# Patient Record
Sex: FEMALE | Race: WHITE | Employment: FULL TIME | URBAN - METROPOLITAN AREA
[De-identification: names, ages, dates, MRNs, and addresses within clinical notes are randomized per-mention and may not be internally consistent; named-entity substitution may affect disease eponyms.]

---

## 2024-08-21 ENCOUNTER — HOSPITAL ENCOUNTER (OUTPATIENT)
Dept: LABOR AND DELIVERY | Age: 29
Discharge: HOME | End: 2024-08-21

## 2024-08-21 ENCOUNTER — HOSPITAL ENCOUNTER (INPATIENT)
Age: 29
LOS: 1 days | Discharge: HOME | End: 2024-08-23
Attending: OBSTETRICS & GYNECOLOGY | Admitting: OBSTETRICS & GYNECOLOGY
Payer: OTHER GOVERNMENT

## 2024-08-21 LAB
ABO/RH: NORMAL
AMPHETAMINES: NEGATIVE
ANTIBODY SCREEN: NEGATIVE
BARBITURATE SCREEN URINE: NEGATIVE
BENZODIAZEPINE SCREEN, URINE: NEGATIVE
CANNABINOID SCREEN URINE: NEGATIVE
COCAINE METABOLITE: NEGATIVE
FENTANYL URINE: NEGATIVE
HCT VFR BLD CALC: 36.2 % (ref 37–47)
HEMOGLOBIN: 11.9 GM/DL (ref 12.5–16)
MCH RBC QN AUTO: 27.8 PG (ref 27–31)
MCHC RBC AUTO-ENTMCNC: 32.9 % (ref 32–36)
MCV RBC AUTO: 84.6 FL (ref 78–100)
OPIATES, URINE: NEGATIVE
OXYCODONE: NEGATIVE
PDW BLD-RTO: 13.2 % (ref 11.7–14.9)
PLATELET # BLD: 220 K/CU MM (ref 140–440)
PMV BLD AUTO: 11.4 FL (ref 7.5–11.1)
RBC # BLD: 4.28 M/CU MM (ref 4.2–5.4)
WBC # BLD: 12.3 K/CU MM (ref 4–10.5)

## 2024-08-21 PROCEDURE — 36415 COLL VENOUS BLD VENIPUNCTURE: CPT

## 2024-08-21 PROCEDURE — 86900 BLOOD TYPING SEROLOGIC ABO: CPT

## 2024-08-21 PROCEDURE — 6370000000 HC RX 637 (ALT 250 FOR IP): Performed by: OBSTETRICS & GYNECOLOGY

## 2024-08-21 PROCEDURE — 86901 BLOOD TYPING SEROLOGIC RH(D): CPT

## 2024-08-21 PROCEDURE — 85027 COMPLETE CBC AUTOMATED: CPT

## 2024-08-21 PROCEDURE — 80307 DRUG TEST PRSMV CHEM ANLYZR: CPT

## 2024-08-21 PROCEDURE — 86780 TREPONEMA PALLIDUM: CPT

## 2024-08-21 PROCEDURE — 86850 RBC ANTIBODY SCREEN: CPT

## 2024-08-21 PROCEDURE — 6360000002 HC RX W HCPCS: Performed by: OBSTETRICS & GYNECOLOGY

## 2024-08-21 PROCEDURE — 2580000003 HC RX 258: Performed by: OBSTETRICS & GYNECOLOGY

## 2024-08-21 RX ORDER — FAMOTIDINE 10 MG/ML
20 INJECTION, SOLUTION INTRAVENOUS 2 TIMES DAILY PRN
Status: DISCONTINUED | OUTPATIENT
Start: 2024-08-21 | End: 2024-08-22 | Stop reason: HOSPADM

## 2024-08-21 RX ORDER — LIDOCAINE HYDROCHLORIDE 10 MG/ML
30 INJECTION, SOLUTION EPIDURAL; INFILTRATION; INTRACAUDAL; PERINEURAL PRN
Status: DISCONTINUED | OUTPATIENT
Start: 2024-08-21 | End: 2024-08-23 | Stop reason: HOSPADM

## 2024-08-21 RX ORDER — METHYLERGONOVINE MALEATE 0.2 MG/ML
200 INJECTION INTRAVENOUS PRN
Status: DISCONTINUED | OUTPATIENT
Start: 2024-08-21 | End: 2024-08-23 | Stop reason: HOSPADM

## 2024-08-21 RX ORDER — ONDANSETRON 4 MG/1
4 TABLET, ORALLY DISINTEGRATING ORAL EVERY 6 HOURS PRN
Status: DISCONTINUED | OUTPATIENT
Start: 2024-08-21 | End: 2024-08-22 | Stop reason: SDUPTHER

## 2024-08-21 RX ORDER — SODIUM CHLORIDE, SODIUM LACTATE, POTASSIUM CHLORIDE, AND CALCIUM CHLORIDE .6; .31; .03; .02 G/100ML; G/100ML; G/100ML; G/100ML
1000 INJECTION, SOLUTION INTRAVENOUS PRN
Status: DISCONTINUED | OUTPATIENT
Start: 2024-08-21 | End: 2024-08-23 | Stop reason: HOSPADM

## 2024-08-21 RX ORDER — CARBOPROST TROMETHAMINE 250 UG/ML
250 INJECTION, SOLUTION INTRAMUSCULAR PRN
Status: DISCONTINUED | OUTPATIENT
Start: 2024-08-21 | End: 2024-08-23 | Stop reason: HOSPADM

## 2024-08-21 RX ORDER — ASPIRIN 81 MG/1
81 TABLET, CHEWABLE ORAL DAILY
COMMUNITY

## 2024-08-21 RX ORDER — TRANEXAMIC ACID 10 MG/ML
1000 INJECTION, SOLUTION INTRAVENOUS
Status: DISCONTINUED | OUTPATIENT
Start: 2024-08-21 | End: 2024-08-22 | Stop reason: SDUPTHER

## 2024-08-21 RX ORDER — ONDANSETRON 2 MG/ML
4 INJECTION INTRAMUSCULAR; INTRAVENOUS EVERY 6 HOURS PRN
Status: DISCONTINUED | OUTPATIENT
Start: 2024-08-21 | End: 2024-08-22 | Stop reason: SDUPTHER

## 2024-08-21 RX ORDER — SODIUM CHLORIDE 0.9 % (FLUSH) 0.9 %
5-40 SYRINGE (ML) INJECTION PRN
Status: DISCONTINUED | OUTPATIENT
Start: 2024-08-21 | End: 2024-08-22 | Stop reason: SDUPTHER

## 2024-08-21 RX ORDER — FENTANYL CITRATE 50 UG/ML
100 INJECTION, SOLUTION INTRAMUSCULAR; INTRAVENOUS
Status: DISCONTINUED | OUTPATIENT
Start: 2024-08-21 | End: 2024-08-22 | Stop reason: HOSPADM

## 2024-08-21 RX ORDER — METOPROLOL TARTRATE 25 MG/1
50 TABLET, FILM COATED ORAL 2 TIMES DAILY
Status: DISCONTINUED | OUTPATIENT
Start: 2024-08-21 | End: 2024-08-23 | Stop reason: HOSPADM

## 2024-08-21 RX ORDER — SODIUM CHLORIDE, SODIUM LACTATE, POTASSIUM CHLORIDE, CALCIUM CHLORIDE 600; 310; 30; 20 MG/100ML; MG/100ML; MG/100ML; MG/100ML
INJECTION, SOLUTION INTRAVENOUS CONTINUOUS
Status: DISCONTINUED | OUTPATIENT
Start: 2024-08-21 | End: 2024-08-23 | Stop reason: HOSPADM

## 2024-08-21 RX ORDER — SODIUM CHLORIDE 0.9 % (FLUSH) 0.9 %
5-40 SYRINGE (ML) INJECTION EVERY 12 HOURS SCHEDULED
Status: DISCONTINUED | OUTPATIENT
Start: 2024-08-21 | End: 2024-08-22 | Stop reason: SDUPTHER

## 2024-08-21 RX ORDER — SODIUM CHLORIDE, SODIUM LACTATE, POTASSIUM CHLORIDE, AND CALCIUM CHLORIDE .6; .31; .03; .02 G/100ML; G/100ML; G/100ML; G/100ML
500 INJECTION, SOLUTION INTRAVENOUS PRN
Status: DISCONTINUED | OUTPATIENT
Start: 2024-08-21 | End: 2024-08-23 | Stop reason: HOSPADM

## 2024-08-21 RX ORDER — TERBUTALINE SULFATE 1 MG/ML
0.25 INJECTION, SOLUTION SUBCUTANEOUS
Status: ACTIVE | OUTPATIENT
Start: 2024-08-21 | End: 2024-08-22

## 2024-08-21 RX ORDER — MISOPROSTOL 200 UG/1
400 TABLET ORAL PRN
OUTPATIENT
Start: 2024-08-21

## 2024-08-21 RX ORDER — SODIUM CHLORIDE 9 MG/ML
25 INJECTION, SOLUTION INTRAVENOUS PRN
Status: DISCONTINUED | OUTPATIENT
Start: 2024-08-21 | End: 2024-08-23 | Stop reason: HOSPADM

## 2024-08-21 RX ADMIN — SODIUM CHLORIDE, POTASSIUM CHLORIDE, SODIUM LACTATE AND CALCIUM CHLORIDE: 600; 310; 30; 20 INJECTION, SOLUTION INTRAVENOUS at 21:18

## 2024-08-21 RX ADMIN — METOPROLOL TARTRATE 50 MG: 25 TABLET, FILM COATED ORAL at 22:54

## 2024-08-21 RX ADMIN — Medication 1 MILLI-UNITS/MIN: at 21:40

## 2024-08-22 ENCOUNTER — ANESTHESIA EVENT (OUTPATIENT)
Dept: LABOR AND DELIVERY | Age: 29
End: 2024-08-22
Payer: OTHER GOVERNMENT

## 2024-08-22 ENCOUNTER — ANESTHESIA (OUTPATIENT)
Dept: LABOR AND DELIVERY | Age: 29
End: 2024-08-22
Payer: OTHER GOVERNMENT

## 2024-08-22 PROBLEM — Z34.90 ENCOUNTER FOR INDUCTION OF LABOR: Status: ACTIVE | Noted: 2024-08-22

## 2024-08-22 LAB
GLUCOSE BLD-MCNC: 82 MG/DL (ref 70–99)
T. PALLIDUM SCREEN: NEGATIVE

## 2024-08-22 PROCEDURE — 82962 GLUCOSE BLOOD TEST: CPT

## 2024-08-22 PROCEDURE — 1220000000 HC SEMI PRIVATE OB R&B

## 2024-08-22 PROCEDURE — 51702 INSERT TEMP BLADDER CATH: CPT

## 2024-08-22 PROCEDURE — 6370000000 HC RX 637 (ALT 250 FOR IP): Performed by: OBSTETRICS & GYNECOLOGY

## 2024-08-22 PROCEDURE — 6360000002 HC RX W HCPCS: Performed by: NURSE ANESTHETIST, CERTIFIED REGISTERED

## 2024-08-22 PROCEDURE — 6360000002 HC RX W HCPCS: Performed by: OBSTETRICS & GYNECOLOGY

## 2024-08-22 PROCEDURE — 3700000025 EPIDURAL BLOCK: Performed by: ANESTHESIOLOGY

## 2024-08-22 PROCEDURE — 2580000003 HC RX 258: Performed by: OBSTETRICS & GYNECOLOGY

## 2024-08-22 PROCEDURE — 7200000001 HC VAGINAL DELIVERY

## 2024-08-22 RX ORDER — MISOPROSTOL 200 UG/1
400 TABLET ORAL PRN
Status: DISCONTINUED | OUTPATIENT
Start: 2024-08-22 | End: 2024-08-23 | Stop reason: HOSPADM

## 2024-08-22 RX ORDER — ONDANSETRON 4 MG/1
4 TABLET, ORALLY DISINTEGRATING ORAL EVERY 6 HOURS PRN
Status: DISCONTINUED | OUTPATIENT
Start: 2024-08-22 | End: 2024-08-23 | Stop reason: HOSPADM

## 2024-08-22 RX ORDER — TRANEXAMIC ACID 10 MG/ML
1000 INJECTION, SOLUTION INTRAVENOUS
Status: ACTIVE | OUTPATIENT
Start: 2024-08-22 | End: 2024-08-23

## 2024-08-22 RX ORDER — ACETAMINOPHEN 500 MG
1000 TABLET ORAL EVERY 8 HOURS SCHEDULED
Status: DISCONTINUED | OUTPATIENT
Start: 2024-08-22 | End: 2024-08-23 | Stop reason: HOSPADM

## 2024-08-22 RX ORDER — ROPIVACAINE HYDROCHLORIDE 2 MG/ML
11 INJECTION, SOLUTION EPIDURAL; INFILTRATION; PERINEURAL CONTINUOUS
Status: DISCONTINUED | OUTPATIENT
Start: 2024-08-22 | End: 2024-08-23 | Stop reason: HOSPADM

## 2024-08-22 RX ORDER — ONDANSETRON 2 MG/ML
4 INJECTION INTRAMUSCULAR; INTRAVENOUS EVERY 6 HOURS PRN
Status: DISCONTINUED | OUTPATIENT
Start: 2024-08-22 | End: 2024-08-23 | Stop reason: HOSPADM

## 2024-08-22 RX ORDER — SODIUM CHLORIDE 0.9 % (FLUSH) 0.9 %
5-40 SYRINGE (ML) INJECTION PRN
Status: DISCONTINUED | OUTPATIENT
Start: 2024-08-22 | End: 2024-08-23 | Stop reason: HOSPADM

## 2024-08-22 RX ORDER — SODIUM CHLORIDE 9 MG/ML
INJECTION, SOLUTION INTRAVENOUS PRN
Status: DISCONTINUED | OUTPATIENT
Start: 2024-08-22 | End: 2024-08-23 | Stop reason: HOSPADM

## 2024-08-22 RX ORDER — IBUPROFEN 800 MG/1
800 TABLET, FILM COATED ORAL EVERY 8 HOURS
Status: DISCONTINUED | OUTPATIENT
Start: 2024-08-22 | End: 2024-08-23 | Stop reason: HOSPADM

## 2024-08-22 RX ORDER — MISOPROSTOL 200 UG/1
800 TABLET ORAL PRN
Status: DISCONTINUED | OUTPATIENT
Start: 2024-08-22 | End: 2024-08-23 | Stop reason: HOSPADM

## 2024-08-22 RX ORDER — LANOLIN 72 %
OINTMENT (GRAM) TOPICAL PRN
Status: DISCONTINUED | OUTPATIENT
Start: 2024-08-22 | End: 2024-08-23 | Stop reason: HOSPADM

## 2024-08-22 RX ORDER — NALOXONE HYDROCHLORIDE 0.4 MG/ML
INJECTION, SOLUTION INTRAMUSCULAR; INTRAVENOUS; SUBCUTANEOUS PRN
Status: DISCONTINUED | OUTPATIENT
Start: 2024-08-22 | End: 2024-08-23 | Stop reason: HOSPADM

## 2024-08-22 RX ORDER — DOCUSATE SODIUM 100 MG/1
100 CAPSULE, LIQUID FILLED ORAL 2 TIMES DAILY
Status: DISCONTINUED | OUTPATIENT
Start: 2024-08-22 | End: 2024-08-23 | Stop reason: HOSPADM

## 2024-08-22 RX ORDER — FERROUS SULFATE 325(65) MG
325 TABLET ORAL EVERY OTHER DAY
Status: DISCONTINUED | OUTPATIENT
Start: 2024-08-23 | End: 2024-08-23 | Stop reason: HOSPADM

## 2024-08-22 RX ORDER — SODIUM CHLORIDE 0.9 % (FLUSH) 0.9 %
5-40 SYRINGE (ML) INJECTION EVERY 12 HOURS SCHEDULED
Status: DISCONTINUED | OUTPATIENT
Start: 2024-08-22 | End: 2024-08-23 | Stop reason: HOSPADM

## 2024-08-22 RX ADMIN — ROPIVACAINE HYDROCHLORIDE 10 ML: 2 INJECTION, SOLUTION EPIDURAL; INFILTRATION at 06:11

## 2024-08-22 RX ADMIN — SODIUM CHLORIDE, POTASSIUM CHLORIDE, SODIUM LACTATE AND CALCIUM CHLORIDE: 600; 310; 30; 20 INJECTION, SOLUTION INTRAVENOUS at 06:12

## 2024-08-22 RX ADMIN — METOPROLOL TARTRATE 50 MG: 25 TABLET, FILM COATED ORAL at 09:51

## 2024-08-22 RX ADMIN — ONDANSETRON 4 MG: 2 INJECTION INTRAMUSCULAR; INTRAVENOUS at 09:45

## 2024-08-22 RX ADMIN — ROPIVACAINE HYDROCHLORIDE 11 ML/HR: 2 INJECTION, SOLUTION EPIDURAL; INFILTRATION at 06:17

## 2024-08-22 RX ADMIN — SODIUM CHLORIDE, POTASSIUM CHLORIDE, SODIUM LACTATE AND CALCIUM CHLORIDE: 600; 310; 30; 20 INJECTION, SOLUTION INTRAVENOUS at 13:49

## 2024-08-22 RX ADMIN — METOPROLOL TARTRATE 50 MG: 25 TABLET, FILM COATED ORAL at 21:08

## 2024-08-22 RX ADMIN — SODIUM CHLORIDE, POTASSIUM CHLORIDE, SODIUM LACTATE AND CALCIUM CHLORIDE: 600; 310; 30; 20 INJECTION, SOLUTION INTRAVENOUS at 05:01

## 2024-08-22 RX ADMIN — ROPIVACAINE HYDROCHLORIDE 11 ML/HR: 2 INJECTION, SOLUTION EPIDURAL; INFILTRATION at 12:16

## 2024-08-22 RX ADMIN — DOCUSATE SODIUM 100 MG: 100 CAPSULE, LIQUID FILLED ORAL at 21:08

## 2024-08-22 ASSESSMENT — PAIN DESCRIPTION - PAIN TYPE: TYPE: ACUTE PAIN

## 2024-08-22 ASSESSMENT — PAIN - FUNCTIONAL ASSESSMENT: PAIN_FUNCTIONAL_ASSESSMENT: ACTIVITIES ARE NOT PREVENTED

## 2024-08-22 ASSESSMENT — PAIN SCALES - GENERAL
PAINLEVEL_OUTOF10: 0
PAINLEVEL_OUTOF10: 4

## 2024-08-22 ASSESSMENT — PAIN DESCRIPTION - LOCATION: LOCATION: ABDOMEN

## 2024-08-22 ASSESSMENT — PAIN DESCRIPTION - FREQUENCY: FREQUENCY: INTERMITTENT

## 2024-08-22 ASSESSMENT — PAIN DESCRIPTION - ONSET: ONSET: ON-GOING

## 2024-08-22 ASSESSMENT — PAIN DESCRIPTION - ORIENTATION: ORIENTATION: LOWER

## 2024-08-22 ASSESSMENT — PAIN DESCRIPTION - DESCRIPTORS: DESCRIPTORS: CRAMPING

## 2024-08-22 NOTE — ANESTHESIA PRE PROCEDURE
Department of Anesthesiology  Preprocedure Note       Name:  Muna Menendez   Age:  29 y.o.  :  1995                                          MRN:  [id number]         Date:  2024      Surgeon: * No surgeons listed *    Procedure: * No procedures listed *    Medications prior to admission:   Prior to Admission medications    Medication Sig Start Date End Date Taking? Authorizing Provider   metFORMIN (GLUCOPHAGE) 500 MG tablet Take 1 tablet by mouth at bedtime   Yes ProviderToo MD   aspirin 81 MG chewable tablet Take 1 tablet by mouth daily   Yes Provider, MD Too   sertraline (ZOLOFT) 50 MG tablet Take 1 tablet by mouth daily   Yes ProviderToo MD   Prenatal MV-Min-Fe Fum-FA-DHA (PRENATAL 1 PO) Take 1 tablet by mouth in the morning and at bedtime   Yes ProviderToo MD   metoprolol tartrate (LOPRESSOR) 50 MG tablet TAKE 1 TABLET BY MOUTH TWICE A DAY 10/30/18  Yes Too Starkey MD   omeprazole (PRILOSEC) 40 MG delayed release capsule TAKE ONE CAPSULE BY MOUTH EVERY DAY 10/11/18  Yes Provider, MD Too       Current medications:    Current Facility-Administered Medications   Medication Dose Route Frequency Provider Last Rate Last Admin    lactated ringers IV soln infusion   IntraVENous Continuous Gustabo Ron  mL/hr at 24 0502 Rate Change at 24 0502    lactated ringers bolus 500 mL  500 mL IntraVENous PRN Gustabo Ron MD        Or    lactated ringers bolus 1,000 mL  1,000 mL IntraVENous PRN Gustabo Ron MD        sodium chloride flush 0.9 % injection 5-40 mL  5-40 mL IntraVENous 2 times per day Gustabo Ron MD        sodium chloride flush 0.9 % injection 5-40 mL  5-40 mL IntraVENous PRN Gustabo Ron MD        0.9 % sodium chloride infusion  25 mL IntraVENous PRN Gustabo Ron MD        oxytocin (PITOCIN) 30 units in 500 mL infusion  1-20 trini-units/min IntraVENous Continuous

## 2024-08-22 NOTE — FLOWSHEET NOTE
1459 Patient actively pushing.  RN remains in continuous attendance at the bedside.  Assessment & evaluation of fetal heart rate ongoing via continuous EFM.    1529 RN remained at bedside throughout pushing.  EFM continuously assessed.  Vaginal delivery of viable infant.

## 2024-08-22 NOTE — PLAN OF CARE
Problem: Vaginal Birth or  Section  Goal: Fetal and maternal status remain reassuring during the birth process  Description:  Birth OB-Pregnancy care plan goal which identifies if the fetal and maternal status remain reassuring during the birth process  2024 by Laurie Huynh RN  Outcome: Progressing  Flowsheets (Taken 2024)  Fetal and Maternal Status Remain Reassuring During the Birth Process:   Monitor vital signs   Monitor uterine activity   Monitor fetal heart rate   Monitor labor progression (Vaginal delivery)   Deep vein thrombosis prophylaxis ( delivery)  2024 by Domonique Del Valle RN  Outcome: Progressing     Problem: Postpartum  Goal: Experiences normal postpartum course  Description:  Postpartum OB-Pregnancy care plan goal which identifies if the mother is experiencing a normal postpartum course  2024 by Laurie Huynh RN  Outcome: Progressing  Flowsheets (Taken 2024)  Experiences Normal Postpartum Course:   Monitor maternal vital signs   Assess uterine involution  2024 by Domonique Del Valle RN  Outcome: Progressing  Goal: Appropriate maternal -  bonding  Description:  Postpartum OB-Pregnancy care plan goal which identifies if the mother and  are bonding appropriately  2024 by Laurie Huynh RN  Outcome: Progressing  2024 by Domonique Del Valle RN  Outcome: Progressing  Goal: Establishment of infant feeding pattern  Description:  Postpartum OB-Pregnancy care plan goal which identifies if the mother is establishing a feeding pattern with their   2024 by Laurie Huynh RN  Outcome: Progressing  Flowsheets (Taken 2024)  Establishment of Infant Feeding Pattern:   Assess breast/bottle feeding   Refer to lactation as needed  2024 by Domoinque Del Valle RN  Outcome: Progressing  Goal: Incisions, wounds, or drain sites healing without S/S of infection  2024  by Huynh, Laurie, RN  Outcome: Progressing  Flowsheets (Taken 8/22/2024 0845)  Incisions, Wounds, or Drain Sites Healing Without Sign and Symptoms of Infection:   ADMISSION and DAILY: Assess and document risk factors for pressure ulcer development   TWICE DAILY: Assess and document skin integrity   TWICE DAILY: Assess and document dressing/incision, wound bed, drain sites and surrounding tissue   Implement wound care per orders   Initiate isolation precautions as appropriate   Initiate pressure ulcer prevention bundle as indicated  8/22/2024 0256 by Domonique Del Valle RN  Outcome: Progressing     Problem: Pain  Goal: Verbalizes/displays adequate comfort level or baseline comfort level  8/22/2024 0845 by Laurie Huynh RN  Outcome: Progressing  Flowsheets (Taken 8/22/2024 0845)  Verbalizes/displays adequate comfort level or baseline comfort level:   Encourage patient to monitor pain and request assistance   Assess pain using appropriate pain scale   Administer analgesics based on type and severity of pain and evaluate response   Implement non-pharmacological measures as appropriate and evaluate response   Consider cultural and social influences on pain and pain management   Notify Licensed Independent Practitioner if interventions unsuccessful or patient reports new pain  8/22/2024 0256 by Domonique Del Valle RN  Outcome: Progressing  Flowsheets (Taken 8/21/2024 2140)  Verbalizes/displays adequate comfort level or baseline comfort level:   Encourage patient to monitor pain and request assistance   Assess pain using appropriate pain scale   Implement non-pharmacological measures as appropriate and evaluate response   Administer analgesics based on type and severity of pain and evaluate response     Problem: Infection - Adult  Goal: Absence of infection at discharge  8/22/2024 0845 by Laurie Huynh RN  Outcome: Progressing  Flowsheets (Taken 8/22/2024 0845)  Absence of infection at discharge:   Assess and monitor for

## 2024-08-22 NOTE — LACTATION NOTE
Mother states she did take an on line breast feeding class.    Initiated breast feeding and breast feeding teaching. Mother states she would like help with breast feeding and gives permission for breast to be touched by IBCLC to assist with latch on and positioning of infant. Discuss with mother different position changes: side lying, cradle hold, and football hold. Discuss with mother that breast feeding babies should breast feed every 2-3 hours for 10 to 15 minutes on each side. Also discuss with mother to listen and watch for infant feeding cues and that the baby may want to breast feed more frequently. Discuss with mother that she has colostrum for the first few days until her milk comes in and that this is enough for the baby the first few days. Explained to mother that the stomach of the baby is small so it fills up quickly and then empties quickly and that is why the infant needs to breast feed frequently. Discuss with mother that she needs to hold the infant head securely during feedings and to hold her breast with her hand to help guide the breast in infant mouth, and that the infant needs to have a deep latch on, more than just the nipple. Explained to mother that this helps stimulate the milk ducts which are farther back on the breast to produce and release milk and also helps to decrease soreness. Explained to mother that if the baby latches on to just the nipple it will increase soreness for her. Discuss with mother that when the infant is latched on well, he will suckle and then take rest from suckling, but that he should stay latched on and that he should suckle more than pause. Lanolin ointment given to mother and explain how to use on nipple to help if nipples become sore. Encouraged mother to allow nipples to air dry after feedings to also help decrease soreness. Mother verbalizes understanding. Mother ask appropriate questions. Encouraged mother to call for any assistance with breast feeding or  any other needs.      Breastfeeding Your Baby booklet given and feeding log sheets given.    States she does have her electric breast pump.

## 2024-08-22 NOTE — PROGRESS NOTES
Department of Obstetrics and Gynecology  Labor and Delivery   Attending Progress Note      SUBJECTIVE:  doing well, comfortable with epidural. Some nausea and vomiting    OBJECTIVE: Blood pressure (!) 107/58, pulse 85, temperature 98.3 °F (36.8 °C), temperature source Oral, resp. rate 17, height 1.803 m (5' 11\"), weight 128.4 kg (283 lb), SpO2 98%.    Fetal heart rate:       Baseline Heart Rate:  140s        Accelerations:  present       Long Term Variability:  moderate       Decelerations:  absent         Contraction frequency: 4 minutes    Membranes:  Ruptured clear fluid    Cervix:         Dilation:  7 cm         Effacement:  90         Station:  0         Position:  anterior             ASSESSMENT 29 y.o. female at 38w1d stalled a bit at 6-7      PLAN: IUPC placed and contraction strength is weak with contractions still spaced. Will carefully increase pitocin beyond 20, judiciously up to a max of 30 if needed.

## 2024-08-22 NOTE — ANESTHESIA PROCEDURE NOTES
Epidural Block    Patient location during procedure: OB  Start time: 8/22/2024 5:55 AM  End time: 8/22/2024 6:03 AM  Reason for block: labor epidural  Staffing  Performed: resident/CRNA   Resident/CRNA: Hector Ventura APRN - CRNA  Performed by: Hector Ventura APRN - CRNA  Authorized by: Onur Fernández MD    Epidural  Patient position: sitting  Prep: ChloraPrep and site prepped and draped  Patient monitoring: continuous pulse ox and frequent blood pressure checks  Approach: midline  Location: L3-4  Injection technique: WILFRED saline  Provider prep: mask and sterile gloves  Needle  Needle type: Tuohy   Needle gauge: 17 G  Needle length: 3.5 in  Needle insertion depth: 7 cm  Catheter type: side hole  Catheter size: 20 G  Catheter at skin depth: 13 cm  Test dose: negativeCatheter Secured: tegaderm and tape  Assessment  Sensory level: T8  Hemodynamics: stable  Attempts: 2  Outcomes: uncomplicated and patient tolerated procedure well  Additional Notes  First attempt (+) blood return in catheter  Preanesthetic Checklist  Completed: patient identified, IV checked, site marked, risks and benefits discussed, surgical/procedural consents, equipment checked, pre-op evaluation, timeout performed, anesthesia consent given, oxygen available, monitors applied/VS acknowledged, fire risk safety assessment completed and verbalized and blood product R/B/A discussed and consented

## 2024-08-22 NOTE — H&P
Department of Obstetrics and Gynecology   Obstetrics History and Physical        CHIEF COMPLAINT:   Chief Complaint   Patient presents with    Scheduled Induction     GDM; 38+0, denies bleeding or LOF, +FM         HISTORY OF PRESENT ILLNESS:      The patient is a 29 y.o. female at 38w0d.  OB History          1    Para        Term                AB        Living             SAB        IAB        Ectopic        Molar        Multiple        Live Births                Patient presents with a chief complaint as above and is being admitted for gestational hypertension    Estimated Due Date: Estimated Date of Delivery: 24    PRENATAL CARE:    Complicated by: gestational HTN on metoprolol 100 mg BID, Gest DMA2, obesity    PAST OB HISTORY  OB History          1    Para        Term                AB        Living             SAB        IAB        Ectopic        Molar        Multiple        Live Births                    Past Medical History:        Diagnosis Date    Diabetes mellitus (HCC)     GDM    Hypertension     Thyroid disease     lobectomy     Past Surgical History:        Procedure Laterality Date    THYROID LOBECTOMY      TONSILLECTOMY AND ADENOIDECTOMY       Allergies:  Patient has no known allergies.  Social History:    Social History     Socioeconomic History    Marital status:      Spouse name: Not on file    Number of children: Not on file    Years of education: Not on file    Highest education level: Not on file   Occupational History    Not on file   Tobacco Use    Smoking status: Never    Smokeless tobacco: Never   Vaping Use    Vaping status: Never Used   Substance and Sexual Activity    Alcohol use: No    Drug use: No    Sexual activity: Not on file   Other Topics Concern    Not on file   Social History Narrative    Not on file     Social Determinants of Health     Financial Resource Strain: Not on file   Food Insecurity: No Food Insecurity (2024)    Hunger Vital

## 2024-08-22 NOTE — FLOWSHEET NOTE
Patient transferred to MB-16 via wheelchair pushed by RN. Baby transferred with mother via crib pushed by FOB.

## 2024-08-22 NOTE — PLAN OF CARE
Problem: Vaginal Birth or  Section  Goal: Fetal and maternal status remain reassuring during the birth process  Description:  Birth OB-Pregnancy care plan goal which identifies if the fetal and maternal status remain reassuring during the birth process  Outcome: Progressing     Problem: Postpartum  Goal: Experiences normal postpartum course  Description:  Postpartum OB-Pregnancy care plan goal which identifies if the mother is experiencing a normal postpartum course  Outcome: Progressing  Goal: Appropriate maternal -  bonding  Description:  Postpartum OB-Pregnancy care plan goal which identifies if the mother and  are bonding appropriately  Outcome: Progressing  Goal: Establishment of infant feeding pattern  Description:  Postpartum OB-Pregnancy care plan goal which identifies if the mother is establishing a feeding pattern with their   Outcome: Progressing  Goal: Incisions, wounds, or drain sites healing without S/S of infection  Outcome: Progressing     Problem: Pain  Goal: Verbalizes/displays adequate comfort level or baseline comfort level  Outcome: Progressing  Flowsheets (Taken 2024)  Verbalizes/displays adequate comfort level or baseline comfort level:   Encourage patient to monitor pain and request assistance   Assess pain using appropriate pain scale   Implement non-pharmacological measures as appropriate and evaluate response   Administer analgesics based on type and severity of pain and evaluate response     Problem: Infection - Adult  Goal: Absence of infection at discharge  Outcome: Progressing  Goal: Absence of infection during hospitalization  Outcome: Progressing  Goal: Absence of fever/infection during anticipated neutropenic period  Outcome: Progressing     Problem: Safety - Adult  Goal: Free from fall injury  Outcome: Progressing     Problem: Discharge Planning  Goal: Discharge to home or other facility with appropriate resources  Outcome: Progressing

## 2024-08-22 NOTE — PLAN OF CARE
Problem: Vaginal Birth or  Section  Goal: Fetal and maternal status remain reassuring during the birth process  Description:  Birth OB-Pregnancy care plan goal which identifies if the fetal and maternal status remain reassuring during the birth process  2024 by Laurie Huynh RN  Outcome: Progressing  Flowsheets (Taken 2024)  Fetal and Maternal Status Remain Reassuring During the Birth Process:   Monitor vital signs   Monitor uterine activity   Monitor fetal heart rate   Monitor labor progression (Vaginal delivery)   Deep vein thrombosis prophylaxis ( delivery)  2024 by Domonique Del Valle RN  Outcome: Progressing     Problem: Postpartum  Goal: Experiences normal postpartum course  Description:  Postpartum OB-Pregnancy care plan goal which identifies if the mother is experiencing a normal postpartum course  2024 by Laurie Huynh RN  Outcome: Progressing  Flowsheets (Taken 2024)  Experiences Normal Postpartum Course:   Monitor maternal vital signs   Assess uterine involution  2024 by Domonique Del Valle RN  Outcome: Progressing  Goal: Appropriate maternal -  bonding  Description:  Postpartum OB-Pregnancy care plan goal which identifies if the mother and  are bonding appropriately  2024 by Laurie Huynh RN  Outcome: Progressing  2024 by Domonique Del Valle RN  Outcome: Progressing  Goal: Establishment of infant feeding pattern  Description:  Postpartum OB-Pregnancy care plan goal which identifies if the mother is establishing a feeding pattern with their   2024 by Laurie Huynh RN  Outcome: Progressing  Flowsheets (Taken 2024)  Establishment of Infant Feeding Pattern:   Assess breast/bottle feeding   Refer to lactation as needed  2024 by Domonique Del Valle RN  Outcome: Progressing  Goal: Incisions, wounds, or drain sites healing without S/S of infection  2024  indicated   Position to facilitate oxygenation and minimize respiratory effort   Encourage broncho-pulmonary hygiene including cough, deep breathe, incentive spirometry   Assess the need for suctioning and aspirate as needed   Assess and instruct to report shortness of breath or any respiratory difficulty   Respiratory therapy support as indicated     Problem: Risk for Deficient Fluid Volume  Goal: Hemodynamic stability and optimal renal function maintained  Description: Interventions:.  -Monitor labs and assess for signs and symptoms of volume excess or deficit    -Monitor intake, output and patient weight  -Monitor response to interventions for patient's volume status, including labs, urine output, blood pressure (other measures as available)  -Fluid restriction as ordered  -Instruct patient on fluid and nutrition restrictions as appropriate      Outcome: Progressing  Flowsheets (Taken 8/22/2024 1607)  Hemodynamic stability and optimal renal function maintained:   Monitor labs and assess for signs and symptoms of volume excess or deficit   Monitor intake, output and patient weight   Monitor response to interventions for patient's volume status, including labs, urine output, blood pressure (other measures as available)   Fluid restriction as ordered   Instruct patient on fluid and nutrition restrictions as appropriate

## 2024-08-23 VITALS
SYSTOLIC BLOOD PRESSURE: 118 MMHG | HEART RATE: 100 BPM | TEMPERATURE: 98.6 F | BODY MASS INDEX: 39.62 KG/M2 | DIASTOLIC BLOOD PRESSURE: 87 MMHG | OXYGEN SATURATION: 99 % | RESPIRATION RATE: 16 BRPM | WEIGHT: 283 LBS | HEIGHT: 71 IN

## 2024-08-23 PROCEDURE — 0KQM0ZZ REPAIR PERINEUM MUSCLE, OPEN APPROACH: ICD-10-PCS | Performed by: OBSTETRICS & GYNECOLOGY

## 2024-08-23 PROCEDURE — 3E033VJ INTRODUCTION OF OTHER HORMONE INTO PERIPHERAL VEIN, PERCUTANEOUS APPROACH: ICD-10-PCS | Performed by: OBSTETRICS & GYNECOLOGY

## 2024-08-23 PROCEDURE — 10907ZC DRAINAGE OF AMNIOTIC FLUID, THERAPEUTIC FROM PRODUCTS OF CONCEPTION, VIA NATURAL OR ARTIFICIAL OPENING: ICD-10-PCS | Performed by: OBSTETRICS & GYNECOLOGY

## 2024-08-23 PROCEDURE — 6370000000 HC RX 637 (ALT 250 FOR IP): Performed by: OBSTETRICS & GYNECOLOGY

## 2024-08-23 RX ORDER — IBUPROFEN 800 MG/1
800 TABLET, FILM COATED ORAL EVERY 6 HOURS PRN
Qty: 30 TABLET | Refills: 1 | Status: CANCELLED | OUTPATIENT
Start: 2024-08-23

## 2024-08-23 RX ORDER — IBUPROFEN 800 MG/1
800 TABLET, FILM COATED ORAL EVERY 8 HOURS
Qty: 120 TABLET | Refills: 3 | Status: SHIPPED | OUTPATIENT
Start: 2024-08-23

## 2024-08-23 RX ORDER — DOCUSATE SODIUM 100 MG/1
100 CAPSULE, LIQUID FILLED ORAL 2 TIMES DAILY PRN
Status: CANCELLED | COMMUNITY
Start: 2024-08-23

## 2024-08-23 RX ORDER — ACETAMINOPHEN 500 MG
1000 TABLET ORAL EVERY 6 HOURS PRN
Status: CANCELLED | COMMUNITY
Start: 2024-08-23

## 2024-08-23 RX ORDER — FERROUS SULFATE 325(65) MG
325 TABLET ORAL EVERY OTHER DAY
Qty: 30 TABLET | Refills: 3 | Status: SHIPPED | OUTPATIENT
Start: 2024-08-25

## 2024-08-23 RX ORDER — PSEUDOEPHEDRINE HCL 30 MG
100 TABLET ORAL 2 TIMES DAILY
Qty: 60 CAPSULE | Refills: 0 | Status: SHIPPED | OUTPATIENT
Start: 2024-08-23

## 2024-08-23 RX ADMIN — DOCUSATE SODIUM 100 MG: 100 CAPSULE, LIQUID FILLED ORAL at 08:06

## 2024-08-23 RX ADMIN — METOPROLOL TARTRATE 50 MG: 25 TABLET, FILM COATED ORAL at 08:07

## 2024-08-23 ASSESSMENT — PAIN SCALES - GENERAL: PAINLEVEL_OUTOF10: 0

## 2024-08-23 NOTE — PLAN OF CARE
Problem: Postpartum  Goal: Experiences normal postpartum course  Description:  Postpartum OB-Pregnancy care plan goal which identifies if the mother is experiencing a normal postpartum course  2024 by Levy Walker RN  Outcome: Completed  2024 by Levy Walker RN  Outcome: Progressing  Goal: Appropriate maternal -  bonding  Description:  Postpartum OB-Pregnancy care plan goal which identifies if the mother and  are bonding appropriately  2024 by Levy Walker RN  Outcome: Completed  2024 by Levy Walker RN  Outcome: Progressing  Goal: Establishment of infant feeding pattern  Description:  Postpartum OB-Pregnancy care plan goal which identifies if the mother is establishing a feeding pattern with their   2024 by Levy Walker RN  Outcome: Completed  2024 by Levy Walker RN  Outcome: Progressing  Goal: Incisions, wounds, or drain sites healing without S/S of infection  2024 by Levy Walker RN  Outcome: Completed  2024 by Levy Walker RN  Outcome: Progressing     Problem: Pain  Goal: Verbalizes/displays adequate comfort level or baseline comfort level  2024 by Levy Walker RN  Outcome: Completed  2024 by Levy Walker RN  Outcome: Progressing     Problem: Infection - Adult  Goal: Absence of infection at discharge  2024 by Leyv Walker RN  Outcome: Completed  2024 by Levy Walker RN  Outcome: Progressing  Goal: Absence of infection during hospitalization  2024 by Levy Walker RN  Outcome: Completed  2024 by Levy Walker RN  Outcome: Progressing  Goal: Absence of fever/infection during anticipated neutropenic period  2024 by Levy Walker RN  Outcome: Completed  2024 by Levy Walker RN  Outcome: Progressing     Problem: Safety - Adult  Goal: Free from fall injury  2024 by Levy Walker RN  Outcome: Completed  2024 by Levy Walker RN  Outcome:  gases  -Initiate smoking cessation protocol as indicated  -Encourage broncho-pulmonary hygiene including cough, deep breathe, incentive spirometry  -Assess the need for suctioning and aspirate as needed  -Assess and instruct to report shortness of breath or any respiratory difficulty  -Respiratory therapy support as indicated      8/23/2024 1734 by Levy Walker RN  Outcome: Completed  8/23/2024 0829 by Levy Walker RN  Outcome: Progressing     Problem: Risk for Deficient Fluid Volume  Goal: Hemodynamic stability and optimal renal function maintained  Description: Interventions:.  -Monitor labs and assess for signs and symptoms of volume excess or deficit    -Monitor intake, output and patient weight  -Monitor response to interventions for patient's volume status, including labs, urine output, blood pressure (other measures as available)  -Fluid restriction as ordered  -Instruct patient on fluid and nutrition restrictions as appropriate      8/23/2024 1734 by Levy Walker RN  Outcome: Completed  8/23/2024 0829 by Levy Walker RN  Outcome: Progressing

## 2024-08-23 NOTE — ANESTHESIA POSTPROCEDURE EVALUATION
Department of Anesthesiology  Postprocedure Note    Patient: Muna Menendez  MRN: [id number]  YOB: 1995  Date of evaluation: 8/23/2024    Procedure Summary       Date: 08/22/24 Room / Location:     Anesthesia Start: 0555 Anesthesia Stop: 1529    Procedure: Labor Analgesia Diagnosis:     Scheduled Providers:  Responsible Provider: Jimenez Morrison MD    Anesthesia Type: epidural ASA Status: 2            Anesthesia Type: No value filed.    Dior Phase I: 10    Dior Phase II: Dior Score: 10    Anesthesia Post Evaluation    Patient location during evaluation: bedside  Patient participation: complete - patient participated  Level of consciousness: awake and alert  Pain score: 1  Airway patency: patent  Nausea & Vomiting: no nausea and no vomiting  Cardiovascular status: hemodynamically stable  Respiratory status: acceptable, room air, spontaneous ventilation and nonlabored ventilation  Hydration status: euvolemic  Pain management: adequate        No notable events documented.

## 2024-08-23 NOTE — LACTATION NOTE
Mother states breast feeding is going well. Father of baby supportive and filling out feeding log sheet.     Reminded mother to hold infant skin to skin as much as possible between feeding times. Also reminded mother about safe sleep and if she is tired, to put infant in the crib. Mother verbalizes understanding. Reminded mother to breast feed at least every 2-3 hours and to offer both breast with each feeding. Informed mother that the infant should do at least 10 minutes (or longer) at each breast and to not limit the time infant is at the breast. Reminded mother to watch for feeding cues such as sucking on fists and rooting to start feedings. Informed mother that if it has been 3 hours and infant has not awaken, she may need to wake infant by unwrapping infant, gently massaging baby, burping infant prior to latch on and/or change diaper. Reminded mother to make sure infant has a deep latch, not just the nipple and that she should feel a tug with feeding but that it should not be painful.     Reminded mother that infants lose weight prior to discharge and that we do not want them to lose more than 10%. Infant should then be back to birth weight by the time he is 10-14 days old. Infants then usually gain from 0.5 to 1 ounce per day the first month.

## 2024-08-23 NOTE — DISCHARGE SUMMARY
Department of Obstetrics and Gynecology  Labor and Delivery  Obstetrical Discharge Form    Name:  Muna Menendez   CSN: [id number]   Attending Provider: Gustabo Ron MD  Location:  [place] [address]-A   : 1995   Age: 29 y.o.    Date of Admission:    2024    Date of Discharge:    24      Admission Diagnosis:  Induction of labor  Present on Admission:   Encounter for induction of labor  GHTN  GDMA2    Discharge Diagnosis:     Same+ delivery of viable male infant    Procedure:      Hospital course:  Patient was admitted for induction of labor. Patient underwent  on 24. Hospital course was uncomplicated, chronic conditions were managed and she was discharged home on postpartum day 1 doing well. She was ambulating, voiding without difficulty and pain was controlled on oral pain medication.     Delivered By:                 Baby:       Information for the patient's :  Se Menendez [[id number]]      Anesthesia:    Epidural    Intrapartum complications: none    Feeding method:   breast    Postpartum complications: none    Data:  Diagnostics:  No results found.    Labs:  Results for orders placed or performed during the hospital encounter of 24   CBC   Result Value Ref Range    WBC 12.3 (H) 4.0 - 10.5 K/CU MM    RBC 4.28 4.2 - 5.4 M/CU MM    Hemoglobin 11.9 (L) 12.5 - 16.0 GM/DL    Hematocrit 36.2 (L) 37 - 47 %    MCV 84.6 78 - 100 FL    MCH 27.8 27 - 31 PG    MCHC 32.9 32.0 - 36.0 %    RDW 13.2 11.7 - 14.9 %    Platelets 220 140 - 440 K/CU MM    MPV 11.4 (H) 7.5 - 11.1 FL   Urine Drug Screen   Result Value Ref Range    Cannabinoid Scrn, Ur NEGATIVE NEGATIVE    Amphetamines NEGATIVE NEGATIVE    Cocaine Metabolite NEGATIVE NEGATIVE    Benzodiazepine Screen, Urine NEGATIVE NEGATIVE    Barbiturate Screen, Ur NEGATIVE NEGATIVE    Opiates, Urine NEGATIVE NEGATIVE    Oxycodone NEGATIVE NEGATIVE    Fentanyl, Ur NEGATIVE NEGATIVE   T Pallidum Screen W/Reflex   Result Value Ref  Range    T. Pallidum Screen NEGATIVE NEGATIVE   POCT Glucose   Result Value Ref Range    POC Glucose 82 70 - 99 MG/DL   TYPE AND SCREEN   Result Value Ref Range    ABO/Rh A POSITIVE     Antibody Screen NEGATIVE        Discharge Meds:       Medication List        ASK your doctor about these medications      aspirin 81 MG chewable tablet     metFORMIN 500 MG tablet  Commonly known as: GLUCOPHAGE     metoprolol tartrate 50 MG tablet  Commonly known as: LOPRESSOR     omeprazole 40 MG delayed release capsule  Commonly known as: PRILOSEC     PRENATAL 1 PO     sertraline 50 MG tablet  Commonly known as: ZOLOFT              Plan:   The patient was instructed to call the office if temperature greater than 100.4, intractable pain/nausea/vomiting or if vaginal bleeding is greater than 1 pad per hour or foul odor/discharge. She is to continue taking her prenatal vitamins and refrain from having anything in the vagina for 6 weeks postpartum. Discharged in stable condition.    Patient will follow up in 1 week for BP check      Vanesa Koehler DO

## 2024-08-23 NOTE — DISCHARGE INSTRUCTIONS
Discharge Instructions    Thank you for letting us care for you and your family.The following are  discharge instructions for yourself and your baby. If you have any questions once you have arrived home please feel free to contact the birthing center at [phone].            MOM INSTRUCTIONS    DIET    Eat a well balanced diet focusing on foods high in fiber and protein.  Drink plenty of fluids (  8 to 10 glasses a day) especially water.  To avoid constipation you may take a mild stool softener as recommended by your doctor or midwife.    ACTIVITY    Gradually increase your activity. Resume your exercise regimen only after advised by you doctor or midwife.  Avoid lifting anything heavier than your baby for 6 weeks or until otherwise advised by your doctor or midwife.  Avoid driving for 2 weeks or if taking narcotics.  Climb stairs one at a time. Use caution when carrying your baby up and down the stairs.  NO SEXUAL ACTIVITY and nothing in your vagina( tampons or douching) for 4 to 6 weeks or until advised by your doctor or midwife.  Be prepared to discuss family planning at your follow-up OB visit.   You may feel tired or have a lack of energy. Nap when the baby naps to catch up on your sleep.You may continue to take prenatal vitamin to replenish nutrients post delivery as directed by your doctor or midwife.      EMOTIONS    You may feel sad, teary, overwhelmed and morocho. Contact your OB provider if symptoms worsen or last more than 2 weeks. Contact your OB provider if you have thoughts of harming yourself or your baby.  If your baby will not stop crying and you are feeling overwhelmed, place your baby in a crib on their back and contact another adult for help. NEVER SHAKE A BABY.                   BLEEDING    Your vaginal bleeding will decrease in amount over the next 2 to 6 weeks.  You will notice that as your activity increases your flow may increase. This is your body's way of telling you to take it easy and rest  elevated.  When wearing stockings or socks,make sure they are not too tight.      WHEN TO CALL THE DOCTOR    If you have a temperature of 100.6 degrees or higher.  If your bleeding has increased and your are soaking a maxi-pad in an hour.  If your abdomen is tender to touch.  If you are passing blood clots bigger than the size of a lemon.  If you are experiencing extreme weakness or dizziness.  If you have are having flu-like symptoms such as achy joints and muscles.  If there is a foul smell or a green color to your vaginal bleeding.  If you have pain that can not be relieved.  If you have persistent burning with urination or frequent urination.  If you have concerns about your well-being.  If you have a warm,firm, red lump in your breast.  If you are unable to sleep,eat, or are having thoughts of harming yourself or the baby.  If you have a red,warm, tender area in your calf.  If you have swelling, bleeding, drainage,foul odor,redness or warmth in or around your incision or stitches.      PAMPHLETS GIVEN TO PARENTS    W.I.C[employer]   Good Nutrition for Women, Infants and Children  : [place] of [place] to help protect the health and wellness of adults and infants.  [facility] [place] of Health: [place]'s [place] Screening Program  [facility] [facility]: [place]  hearing Screening  [facility]: [place], [place] regional Postpartum Depression Support Network  [facility] [place] of Health: All kids need Hepatitis B shots!  Back to sleep handout  Shaken baby handout        [facility]   [address] [id number]  [phone]      [place] [address]  [phone]

## 2024-08-23 NOTE — PLAN OF CARE
Problem: Postpartum  Goal: Experiences normal postpartum course  Description:  Postpartum OB-Pregnancy care plan goal which identifies if the mother is experiencing a normal postpartum course  2024 by Veronica Bower RN  Outcome: Progressing  2024 by Laurie Huynh RN  Outcome: Progressing  Flowsheets (Taken 202445)  Experiences Normal Postpartum Course:   Monitor maternal vital signs   Assess uterine involution  Goal: Appropriate maternal -  bonding  Description:  Postpartum OB-Pregnancy care plan goal which identifies if the mother and  are bonding appropriately  2024 by Veronica Bower RN  Outcome: Progressing  2024 by Laurie Huynh RN  Outcome: Progressing  Goal: Establishment of infant feeding pattern  Description:  Postpartum OB-Pregnancy care plan goal which identifies if the mother is establishing a feeding pattern with their   2024 by Veronica Bower RN  Outcome: Progressing  2024 by Laurie Huynh RN  Outcome: Progressing  Flowsheets (Taken 202445)  Establishment of Infant Feeding Pattern:   Assess breast/bottle feeding   Refer to lactation as needed  Goal: Incisions, wounds, or drain sites healing without S/S of infection  2024 by Veronica Bower RN  Outcome: Progressing  2024 by Laurie Huynh RN  Outcome: Progressing  Flowsheets (Taken 202445)  Incisions, Wounds, or Drain Sites Healing Without Sign and Symptoms of Infection:   ADMISSION and DAILY: Assess and document risk factors for pressure ulcer development   TWICE DAILY: Assess and document skin integrity   TWICE DAILY: Assess and document dressing/incision, wound bed, drain sites and surrounding tissue   Implement wound care per orders   Initiate isolation precautions as appropriate   Initiate pressure ulcer prevention bundle as indicated     Problem: Pain  Goal: Verbalizes/displays adequate comfort level or baseline comfort  decreased cardiac output   Administer fluid and/or volume expanders as ordered  Goal: Achieves optimal ventilation and oxygenation  Description: INTERVENTIONS:.  -Assess for changes in respiratory status   -Assess for changes in mentation and behavior  -Position to facilitate oxygenation and minimize respiratory effort  -Oxygen supplementation based on oxygen saturation or arterial blood gases  -Initiate smoking cessation protocol as indicated  -Encourage broncho-pulmonary hygiene including cough, deep breathe, incentive spirometry  -Assess the need for suctioning and aspirate as needed  -Assess and instruct to report shortness of breath or any respiratory difficulty  -Respiratory therapy support as indicated      8/22/2024 2235 by Veronica Bower, RN  Outcome: Progressing  8/22/2024 1607 by Laurie Huynh, RN  Outcome: Progressing  Flowsheets (Taken 8/22/2024 1607)  Achieves optimal ventilation and oxygenation:   Assess for changes in respiratory status   Assess for changes in mentation and behavior   Oxygen supplementation based on oxygen saturation or arterial blood gases   Initiate smoking cessation protocol as indicated   Position to facilitate oxygenation and minimize respiratory effort   Encourage broncho-pulmonary hygiene including cough, deep breathe, incentive spirometry   Assess the need for suctioning and aspirate as needed   Assess and instruct to report shortness of breath or any respiratory difficulty   Respiratory therapy support as indicated     Problem: Risk for Deficient Fluid Volume  Goal: Hemodynamic stability and optimal renal function maintained  Description: Interventions:.  -Monitor labs and assess for signs and symptoms of volume excess or deficit    -Monitor intake, output and patient weight  -Monitor response to interventions for patient's volume status, including labs, urine output, blood pressure (other measures as available)  -Fluid restriction as ordered  -Instruct patient on fluid and

## 2024-08-23 NOTE — PROGRESS NOTES
Department of Obstetrics and Gynecology  Labor and Delivery Postpartum Note  Name:  Muna Menendez   CSN: [id number]   Attending Provider: Gustabo Ron MD  [facility]   : 1995   Age: 29 y.o.  Subjective:     Postpartum Day 1:   The patient is doing well without complaints. The patient denies emotional concerns. Pain is well controlled with current medications. The baby is doing well and patient is breast feeding. The patient is ambulating without difficulty. Patient urinating without difficulty. She is tolerating a normal diet. Denies fevers/chills, chest pain, shortness of breath. No nausea or vomiting. Denies headache, vision changes or RUQ pain.    Objective:        Vitals:    24 0410   BP: 131/85   Pulse: 84   Resp: 16   Temp: 98.4 °F (36.9 °C)   SpO2: 98%       Lab Results   Component Value Date    WBC 12.3 (H) 2024    HGB 11.9 (L) 2024    HCT 36.2 (L) 2024    MCV 84.6 2024     2024       General:    Alert, no acute distress   Respiratory  Unlabored breathing   Lochia:  Mild   Uterine    Firm and below level of umbilicus       DVT Evaluation:  No tenderness or edema on exam.     Assessment/Plan:     Postpartum day 1  Status Post:   Doing well postpartum, AFVSS  Continue current management  Encouraged ambulation  Regular diet as tolerated  Circ desired, will plan to do this evening    GHTN  Metoprolol 50 mg BID  Bps controlled overnight  GDMA2  2hr glucose 6-8 weeks postpartum    Plan discharge home PPD#2    Vanesa Koehler DO 2024 7:36 AM

## 2024-08-23 NOTE — LACTATION NOTE
Entered mother room, mother is breast feeding infant. Mother states infant did breast feed well, but was sleepy at one time. Father of baby at bedside and supportive.     Reminded mother to breast feed at least every 3 hours or more often with feeding cues, offer both breasts with each feeding and for 10 minutes or longer on each side.     Discussed with mother about breast feeding and that some infants will have what is termed \"second night syndrome\" where the infant will want to eat frequently, including even every hour. Informed mother that wanting to cluster feed, where infant does eat every hour, does not mean that infant is not getting enough milk. Encouraged mother to hold infant skin to skin as much as possible. Encouraged mother to drink plenty of fluid and to rest between feedings.    Encouraged mother to call for any breast feeding assistance as needed.

## 2024-08-23 NOTE — PLAN OF CARE
Problem: Postpartum  Goal: Experiences normal postpartum course  Description:  Postpartum OB-Pregnancy care plan goal which identifies if the mother is experiencing a normal postpartum course  2024 by Levy Walker RN  Outcome: Progressing  2024 by Veronica Bower RN  Outcome: Progressing  Goal: Appropriate maternal -  bonding  Description:  Postpartum OB-Pregnancy care plan goal which identifies if the mother and  are bonding appropriately  2024 by Levy Walker RN  Outcome: Progressing  2024 by Veronica Bower RN  Outcome: Progressing  Goal: Establishment of infant feeding pattern  Description:  Postpartum OB-Pregnancy care plan goal which identifies if the mother is establishing a feeding pattern with their   2024 by Levy Walker RN  Outcome: Progressing  2024 by Veronica Bower RN  Outcome: Progressing  Goal: Incisions, wounds, or drain sites healing without S/S of infection  2024 by Levy Walker RN  Outcome: Progressing  2024 by Veronica Bower RN  Outcome: Progressing     Problem: Pain  Goal: Verbalizes/displays adequate comfort level or baseline comfort level  2024 by Levy Walker RN  Outcome: Progressing  2024 by Veronica Bower RN  Outcome: Progressing     Problem: Infection - Adult  Goal: Absence of infection at discharge  2024 by Levy Walker RN  Outcome: Progressing  2024 by Veronica Bower RN  Outcome: Progressing  Goal: Absence of infection during hospitalization  2024 by Levy Walker RN  Outcome: Progressing  2024 by Veronica Bower RN  Outcome: Progressing  Goal: Absence of fever/infection during anticipated neutropenic period  2024 by Levy Walker RN  Outcome: Progressing  2024 by Veronica Bower RN  Outcome: Progressing     Problem: Safety - Adult  Goal: Free from fall injury  2024 by Levy Walker RN  Outcome:  Progressing  8/22/2024 2235 by Veronica Bower RN  Outcome: Progressing     Problem: Discharge Planning  Goal: Discharge to home or other facility with appropriate resources  8/23/2024 0829 by Levy Walker RN  Outcome: Progressing  8/22/2024 2235 by Veronica Bower RN  Outcome: Progressing     Problem: Chronic Conditions and Co-morbidities  Goal: Patient's chronic conditions and co-morbidity symptoms are monitored and maintained or improved  8/23/2024 0829 by Levy Walker RN  Outcome: Progressing  8/22/2024 2235 by Veronica Bower RN  Outcome: Progressing     Problem: Risk for Maternal Injury  Goal: Remains free from seizures and injury related to seizure activity  Description: INTERVENTIONS:.  -Maintain airway, patient safety and administer oxygen as ordered  -Monitor patient for seizure activity, document and report duration and descrition  -If Seizure occurs, turn patient to dide and suction secretions as needed  -Reorient patient post seizure  -Seizure Pads  -Instruct patient/family to notify RN of any seizure activity  -Instruct patient/family to call for assistance with activity based on assessment  8/23/2024 0829 by Levy Walker RN  Outcome: Progressing  8/22/2024 2235 by Veronica Bower RN  Outcome: Progressing     Problem: Risk for Decreased Cardiac Output  Goal: Maintains optimal cardiac output and hemodynamic stability  Description: INTERVENTIONS:.  -Monitor blood pressure and heart rate  -Monitor urine output and notify licensed practitioner for values outside of   -Assess for signes of decreased cardiac output  -Administer fluid and /or volume expanders as ordered    8/23/2024 0829 by Levy Walker RN  Outcome: Progressing  8/22/2024 2235 by Veronica Bower RN  Outcome: Progressing  Goal: Achieves optimal ventilation and oxygenation  Description: INTERVENTIONS:.  -Assess for changes in respiratory status   -Assess for changes in mentation and behavior  -Position to facilitate oxygenation and minimize respiratory  effort  -Oxygen supplementation based on oxygen saturation or arterial blood gases  -Initiate smoking cessation protocol as indicated  -Encourage broncho-pulmonary hygiene including cough, deep breathe, incentive spirometry  -Assess the need for suctioning and aspirate as needed  -Assess and instruct to report shortness of breath or any respiratory difficulty  -Respiratory therapy support as indicated      8/23/2024 0829 by Levy Walker RN  Outcome: Progressing  8/22/2024 2235 by Veronica Bower RN  Outcome: Progressing     Problem: Risk for Deficient Fluid Volume  Goal: Hemodynamic stability and optimal renal function maintained  Description: Interventions:.  -Monitor labs and assess for signs and symptoms of volume excess or deficit    -Monitor intake, output and patient weight  -Monitor response to interventions for patient's volume status, including labs, urine output, blood pressure (other measures as available)  -Fluid restriction as ordered  -Instruct patient on fluid and nutrition restrictions as appropriate      8/23/2024 0829 by Levy Walker, RN  Outcome: Progressing  8/22/2024 2235 by Veronica Bower, RN  Outcome: Progressing

## 2024-08-23 NOTE — PROGRESS NOTES
ID bands checked. Infant's ID band removed and stapled to footprint sheet, the mother verified as correct, signed and witnessed by RN. Hugs tag removed. Mother of baby signed Safe Baby Crib Form verifying that she does have a safe crib for baby at home. Discharge instructions given and reviewed. Patient voiced understanding. Rx's reviewed and Electronically sent to Patient Pharmacy.  Patient voiced understanding. Patient is ambulating well at discharge with pain #0. Pt's  is driving patient and baby home. Mother verbalizes understanding to follow-up with Pediatric Provider in 2-3 days, and OB Provider Dr. Ron in 1 week for bp check and in 6 weeks as instructed. Baby pink, harnessed into carseat at discharge by parents. Parents and baby escorted to hospital exit by nurse.

## 2024-08-30 NOTE — L&D DELIVERY NOTE
Shamar Menendez Nabil [[id number]]      Labor Events     Labor: No   Steroids: None  Cervical Ripening Date/Time:        Rupture Date/Time:  24 23:20:00   Rupture Type: AROM, Intact  Fluid Color: Clear  Fluid Odor: None  Fluid Volume: Moderate  Induction: Oxytocin, AROM       Anesthesia    Method: Epidural       Labor Event Times      Labor onset date/time:  24 06:40:00     Dilation complete date/time:  24 14:49:00 EDT     Start pushing date/time:  2024 14:49:00   Decision date/time (emergent ):            Labor Length    1st stage: 8h 09m  2nd stage: 0h 40m  3rd stage: 0h 03m       Delivery Details      Delivery Date: 24 Delivery Time: 15:29:00   Delivery Type: Vaginal, Spontaneous               Presentation    Presentation: Vertex       Shoulder Dystocia    Shoulder Dystocia Present?: No       Assisted Delivery Details    Forceps Attempted?: No  Vacuum Extractor Attempted?: No                           Cord    Complications: Nuchal Loose  Cord Around: Head  Delayed Cord Clamping?: Yes              Placenta    Date/Time: 2024 15:32:00  Removal: Spontaneous  Appearance: Intact  Disposition: Placenta Refrigerator       Lacerations           Vaginal Counts    Initial Count Personnel: DR. SINGH  Initial Count Verified By: FRANKY MARIE  Intial Sponge Count: Correct Intial Needles Count: Correct Intial Instruments Count: Correct   Final Sponges Count: Correct Final Needles  Count: Correct Final Instruments Count: Correct   Final Count Personnel: DR. SINGH  Final Count Verified By: FRANKY MARIE  Accurate Final Count?: Yes       Blood Loss  Mother: Gemma, Muna Mcdaniels #[id number]     Start of Mother's Information      Delivery Blood Loss  24 0640 - 24 1529      Quantitative Blood Loss (mL) Hospital Encounter 281 grams    Total  281 mL               End of Mother's Information  Mother: Gemma, Muna Mcdaniels #[id number]                Delivery

## 2025-08-24 RX ORDER — BLOOD SUGAR DIAGNOSTIC
STRIP MISCELLANEOUS 3 TIMES DAILY
COMMUNITY

## 2025-08-24 RX ORDER — LEVOTHYROXINE SODIUM 50 UG/1
50 TABLET ORAL DAILY
COMMUNITY

## 2025-08-24 RX ORDER — CHOLECALCIFEROL (VITAMIN D3) 1250 MCG
CAPSULE ORAL WEEKLY
COMMUNITY

## 2025-08-24 RX ORDER — AVOBENZONE, HOMOSALATE, OCTISALATE, OCTOCRYLENE 30; 40; 45; 26 MG/ML; MG/ML; MG/ML; MG/ML
1 CREAM TOPICAL 3 TIMES DAILY
COMMUNITY